# Patient Record
Sex: FEMALE | Race: WHITE | NOT HISPANIC OR LATINO | URBAN - METROPOLITAN AREA
[De-identification: names, ages, dates, MRNs, and addresses within clinical notes are randomized per-mention and may not be internally consistent; named-entity substitution may affect disease eponyms.]

---

## 2019-05-06 ENCOUNTER — APPOINTMENT (RX ONLY)
Dept: URBAN - METROPOLITAN AREA CLINIC 117 | Facility: CLINIC | Age: 59
Setting detail: DERMATOLOGY
End: 2019-05-06

## 2019-05-06 DIAGNOSIS — L82.0 INFLAMED SEBORRHEIC KERATOSIS: ICD-10-CM

## 2019-05-06 DIAGNOSIS — H01.13 ECZEMATOUS DERMATITIS OF EYELID: ICD-10-CM

## 2019-05-06 DIAGNOSIS — B35.1 TINEA UNGUIUM: ICD-10-CM

## 2019-05-06 PROBLEM — H01.133 ECZEMATOUS DERMATITIS OF RIGHT EYE, UNSPECIFIED EYELID: Status: ACTIVE | Noted: 2019-05-06

## 2019-05-06 PROBLEM — L40.0 PSORIASIS VULGARIS: Status: ACTIVE | Noted: 2019-05-06

## 2019-05-06 PROBLEM — Z85.828 PERSONAL HISTORY OF OTHER MALIGNANT NEOPLASM OF SKIN: Status: ACTIVE | Noted: 2019-05-06

## 2019-05-06 PROCEDURE — 99213 OFFICE O/P EST LOW 20 MIN: CPT | Mod: 25

## 2019-05-06 PROCEDURE — ? BENIGN DESTRUCTION

## 2019-05-06 PROCEDURE — ? COUNSELING

## 2019-05-06 PROCEDURE — 17110 DESTRUCTION B9 LES UP TO 14: CPT

## 2019-05-06 ASSESSMENT — LOCATION ZONE DERM
LOCATION ZONE: EYELID
LOCATION ZONE: TRUNK

## 2019-05-06 ASSESSMENT — LOCATION SIMPLE DESCRIPTION DERM
LOCATION SIMPLE: RIGHT EYELID
LOCATION SIMPLE: LEFT LOWER BACK

## 2019-05-06 ASSESSMENT — LOCATION DETAILED DESCRIPTION DERM
LOCATION DETAILED: RIGHT LATERAL CANTHUS
LOCATION DETAILED: LEFT INFERIOR MEDIAL MIDBACK

## 2019-05-06 NOTE — HPI: INFECTION (ONYCHOMYCOSIS)
Is This A New Presentation, Or A Follow-Up?: Follow Up Onychomycosis
Additional History: Wants looked at, patient states it keeps getting infected  Patient has been applying antibiotic ointment has only used twice \\nStarted jublia solution 10/18

## 2019-05-06 NOTE — PROCEDURE: COUNSELING
Detail Level: Detailed
Patient Specific Counseling (Will Not Stick From Patient To Patient): Recommend discontinue Jublia until lateral nail fold heals (ingrown nail/paronychia can be a side effect).  Once healed, resume Jublia and continue QD usage if no problems.\\nReviewed proper use and expectations with Jublia: best effect requires compliant, long term use
Patient Specific Counseling (Will Not Stick From Patient To Patient): Okay to continue TAC 0.1% cream ('s) QD x3 days.\\nCall if not improved or resolved by Thursday: I only want her using the TAC for less than 5 days.  \\nPO antihistamine can be helpful as well.

## 2023-08-01 NOTE — PROCEDURE: BENIGN DESTRUCTION
Chief Complaint   Patient presents with    Weight Management       HPI:  Patient presents today for follow up of Ozempic. She is now on the 0.5 mg weekly. She denies any negative side effects. She reports that it has decreased her appetite. She does report that her  did pass away 6/23/2023. Reviewed labs with her as well, vitamin D is low, however she is taking 50,000 iu weekly. She is going to be seeing a neurosurgeon to discuss operating on her back. She also reports that she has not been taking any of her daily medications including her blood thinner and antidepressant medications. Patient lost her  about 1 month ago and is having a hard time dealing with this. Encouraged patient to discuss this with her PCP and counselor as it is unsafe to discontinue these medications. Patient verbalized understanding. Prior to Visit Medications    Medication Sig Taking? Authorizing Provider   Semaglutide,0.25 or 0.5MG/DOS, (OZEMPIC, 0.25 OR 0.5 MG/DOSE,) 2 MG/1.5ML SOPN Inject 0.5 mg into the skin once a week Yes KRYSTA Kline CNP   Semaglutide,0.25 or 0.5MG/DOS, (OZEMPIC, 0.25 OR 0.5 MG/DOSE,) 2 MG/1.5ML SOPN Inject 0.25 mg into the skin once a week Yes KRYSTA Kline CNP   esomeprazole Magnesium (NEXIUM) 40 MG PACK Take 1 packet by mouth daily Yes Historical Provider, MD   albuterol sulfate HFA (PROVENTIL HFA) 108 (90 Base) MCG/ACT inhaler Inhale 2 puffs into the lungs every 6 hours as needed for Wheezing Yes KRYSTA Betancourt CNP   eszopiclone (LUNESTA) 1 MG TABS Take 1 tablet by mouth nightly as needed (sleep) for up to 14 days.  Max Daily Amount: 1 mg  Patient not taking: Reported on 8/1/2023  [facility], DO   diclofenac sodium (VOLTAREN) 1 % GEL Apply 4 g topically 4 times daily as needed for Pain Apply to the right hip and knee  Patient not taking: Reported on 7/18/2023  KRYSTA Garcia CNP   venlafaxine (EFFEXOR XR) 75 MG extended release capsule Take 1
Render Post-Care Instructions In Note?: yes
Render Note In Bullet Format When Appropriate: No
Detail Level: Detailed
Treatment Number (Will Not Render If 0): 0
Medical Necessity Information: It is in your best interest to select a reason for this procedure from the list below. All of these items fulfill various CMS LCD requirements except the new and changing color options.
Consent: The patient's consent was obtained including but not limited to risks of crusting, scabbing, blistering, scarring, darker or lighter pigmentary change, recurrence, incomplete removal and infection.
Anesthesia Volume In Cc: 0.5
Post-Care Instructions: I reviewed with the patient in detail post-care instructions. Patient is to wear sunprotection, and avoid picking at any of the treated lesions. Pt may apply Vaseline to crusted or scabbing areas.
Medical Necessity Clause: This procedure was medically necessary because the lesions that were treated were: